# Patient Record
(demographics unavailable — no encounter records)

---

## 2024-11-06 NOTE — DISCUSSION/SUMMARY
[FreeTextEntry1] : Concern for speech delay. At this exam patient is responsive to his name with some hesitancy. Continue to read to him, speak to him and follow up at 15 months. At that time if he is still not responding to his name will refer for EI.  All questions answered. Caretaker understands and agrees with plan. [] : The components of the vaccine(s) to be administered today are listed in the plan of care. The disease(s) for which the vaccine(s) are intended to prevent and the risks have been discussed with the caretaker.  The risks are also included in the appropriate vaccination information statements which have been provided to the patient's caregiver.  The caregiver has given consent to vaccinate.

## 2024-11-06 NOTE — HISTORY OF PRESENT ILLNESS
[de-identified] : not always recognizing his name.  [FreeTextEntry6] : Baby says "esteban" and is learning to walk. He is pointing, clapping and waving. Parents concerned he is not always responding to his name.

## 2025-01-15 NOTE — DISCUSSION/SUMMARY
[Normal Growth] : growth [None] : No known medical problems [No Elimination Concerns] : elimination [No Feeding Concerns] : feeding [No Skin Concerns] : skin [Normal Sleep Pattern] : sleep [Communication and Social Development] : communication and social development [Sleep Routines and Issues] : sleep routines and issues [Temper Tantrums and Discipline] : temper tantrums and discipline [Healthy Teeth] : healthy teeth [Safety] : safety [No Medications] : ~He/She~ is not on any medications [Parent/Guardian] : parent/guardian [Delayed Gross Motor Skills] : delayed gross motor skills [] : The components of the vaccine(s) to be administered today are listed in the plan of care. The disease(s) for which the vaccine(s) are intended to prevent and the risks have been discussed with the caretaker.  The risks are also included in the appropriate vaccination information statements which have been provided to the patient's caregiver.  The caregiver has given consent to vaccinate. [FreeTextEntry1] : Continue whole cow's milk. Continue table foods, 3 meals with 2-3 snacks per day. Incorporate flourinated water daily in a sippy cup. Brush teeth twice a day with soft toothbrush. Recommend visit to dentist. When in car, patient should be in rear-facing car seat in back seat if under 20 lbs. As per seat 's guidelines, may switch to foward-facing car seat in back seat of car. Put baby to sleep in own crib. Lower crib matress. Help baby to maintain consistent daily routines and sleep schedule. Recognize stranger and separation anxiety. Ensure home is safe since baby is increasingly mobile. Be within arm's reach of baby at all times. Use consistent, positive discipline. Read aloud to baby.  Return in 3 mo for 18 mo well child check. Gross motor delay: not seemingly from weakness. Toddler is holding on to things, climbing well and dancing in place. Keep encouraging him to take independent steps, avoid picking him up each time.  Labs obtained in office.  For occasional constipation For infants who have constipation with difficulty evacuating stools Eliminate rice cereal or any grains a week. Introduce prunes, more water 4-6 oz offered throughout the day if over 4 months old. Use glycerine suppository every 8 hours if not resolved. Put the infant in a squatting position and massage their belly clockwise.  For toddlers: Eliminate all grains, rice, bread, pasta for one week. Offer less milk in the diet and add 1/2 capfull of miralax once a day. If not helping add a second time of miralax a day. If need to evacuate large hard stools use the Children's Fleet enemas 1-2 times once. Try and evacuate the hard stools first and also can massage the abdomen until the child has a soft BM. Continue to introduce more fiber and water in their diet and avoid crackers and rice products. Once child is going soft and formed stools without pain decrease the miralax as needed.

## 2025-01-15 NOTE — HISTORY OF PRESENT ILLNESS
[Cow's milk (Ounces per day ___)] : consumes [unfilled] oz of cow's milk per day [Fruit] : fruit [Vegetables] : vegetables [Meat] : meat [Eggs] : eggs [Baby food] : baby food [Finger Foods] : finger foods [Normal] : Normal [In crib] : In crib [Sippy cup use] : Sippy cup use [Brushing teeth] : Brushing teeth [Toothpaste] : Primary Fluoride Source: Toothpaste [Playtime] : Playtime [Temper Tantrums] : Temper tantrums [No] : Not at  exposure [Water heater temperature set at <120 degrees F] : Water heater temperature set at <120 degrees F [Car seat in back seat] : Car seat in back seat [Carbon Monoxide Detectors] : Carbon monoxide detectors [Smoke Detectors] : Smoke detectors [Up to date] : Up to date [NO] : No [Cereal] : cereal [Table food] : table food [___ stools every other day] : [unfilled]  stools every other day [Firm] : firm consistency [Pacifier use] : Pacifier use [Exposure to electronic nicotine delivery system] : No exposure to electronic nicotine delivery system [FreeTextEntry7] : 15 month old for his well visit [de-identified] : not walking by himself and tends to get constipated

## 2025-01-15 NOTE — DEVELOPMENTAL MILESTONES
[Yes: _______] : yes, [unfilled] [Drinks from cup with little] : drinks from cup with little spilling [Uses 3 words other than names] : uses 3 words other than names [Speaks in sounds that seem like] : speaks in sounds that seem like an unknown language [Follows directions that do not] : follows direction that do not include a gesture [Looks when parent says,] : looks when parent says, "Where is...?" [Squats to  objects] : squats to  objects [Makes kun with crayon] : makes kun with carayon [Drops object into and takes object] : drops object into and takes object out of container [No] : Not Completed. [Points to ask for something] : does not point to ask for something or to get help [Crawls up a few steps] : does not crawl up a few steps [Begins to run] : does not begin to run [FreeTextEntry1] : seems to be "faking" his innability to walk. If parents are home and watching he "plops down" but if they are not looking, "he is able to walk a step or more".

## 2025-01-15 NOTE — PHYSICAL EXAM
[Alert] : alert [No Acute Distress] : no acute distress [Normocephalic] : normocephalic [Anterior [place] Closed] : anterior fontanelle closed [Red Reflex Bilateral] : red reflex bilateral [PERRL] : PERRL [Normally Placed Ears] : normally placed ears [Auricles Well Formed] : auricles well formed [Clear Tympanic membranes with present light reflex and bony landmarks] : clear tympanic membranes with present light reflex and bony landmarks [No Discharge] : no discharge [Nares Patent] : nares patent [Palate Intact] : palate intact [Uvula Midline] : uvula midline [Tooth Eruption] : tooth eruption  [Supple, full passive range of motion] : supple, full passive range of motion [No Palpable Masses] : no palpable masses [Symmetric Chest Rise] : symmetric chest rise [Clear to Auscultation Bilaterally] : clear to auscultation bilaterally [Regular Rate and Rhythm] : regular rate and rhythm [S1, S2 present] : S1, S2 present [No Murmurs] : no murmurs [+2 Femoral Pulses] : +2 femoral pulses [Soft] : soft [NonTender] : non tender [Non Distended] : non distended [Normoactive Bowel Sounds] : normoactive bowel sounds [No Hepatomegaly] : no hepatomegaly [No Splenomegaly] : no splenomegaly [Central Urethral Opening] : central urethral opening [Testicles Descended Bilaterally] : testicles descended bilaterally [Patent] : patent [Normally Placed] : normally placed [No Abnormal Lymph Nodes Palpated] : no abnormal lymph nodes palpated [No Clavicular Crepitus] : no clavicular crepitus [Negative Agarwal-Ortalani] : negative Agarwal-Ortalani [Symmetric Buttocks Creases] : symmetric buttocks creases [No Spinal Dimple] : no spinal dimple [NoTuft of Hair] : no tuft of hair [Cranial Nerves Grossly Intact] : cranial nerves grossly intact [No Rash or Lesions] : no rash or lesions